# Patient Record
Sex: MALE | Race: WHITE | NOT HISPANIC OR LATINO | Employment: OTHER | URBAN - METROPOLITAN AREA
[De-identification: names, ages, dates, MRNs, and addresses within clinical notes are randomized per-mention and may not be internally consistent; named-entity substitution may affect disease eponyms.]

---

## 2017-04-04 ENCOUNTER — HOSPITAL ENCOUNTER (EMERGENCY)
Facility: HOSPITAL | Age: 47
Discharge: HOME | End: 2017-04-04
Attending: EMERGENCY MEDICINE
Payer: COMMERCIAL

## 2017-04-04 VITALS
DIASTOLIC BLOOD PRESSURE: 116 MMHG | OXYGEN SATURATION: 100 % | HEART RATE: 86 BPM | WEIGHT: 195 LBS | HEIGHT: 70 IN | TEMPERATURE: 99 F | RESPIRATION RATE: 16 BRPM | BODY MASS INDEX: 27.92 KG/M2 | SYSTOLIC BLOOD PRESSURE: 192 MMHG

## 2017-04-04 DIAGNOSIS — G89.29 CHRONIC MIDLINE LOW BACK PAIN WITH BILATERAL SCIATICA: ICD-10-CM

## 2017-04-04 DIAGNOSIS — M54.41 CHRONIC MIDLINE LOW BACK PAIN WITH BILATERAL SCIATICA: ICD-10-CM

## 2017-04-04 DIAGNOSIS — L20.9 ATOPIC DERMATITIS, UNSPECIFIED TYPE: Primary | ICD-10-CM

## 2017-04-04 DIAGNOSIS — M54.42 CHRONIC MIDLINE LOW BACK PAIN WITH BILATERAL SCIATICA: ICD-10-CM

## 2017-04-04 PROCEDURE — 25000003 PHARM REV CODE 250: Performed by: PHYSICIAN ASSISTANT

## 2017-04-04 PROCEDURE — 99283 EMERGENCY DEPT VISIT LOW MDM: CPT | Mod: 25

## 2017-04-04 PROCEDURE — 63600175 PHARM REV CODE 636 W HCPCS: Performed by: PHYSICIAN ASSISTANT

## 2017-04-04 PROCEDURE — 96372 THER/PROPH/DIAG INJ SC/IM: CPT

## 2017-04-04 PROCEDURE — 99283 EMERGENCY DEPT VISIT LOW MDM: CPT | Mod: ,,, | Performed by: PHYSICIAN ASSISTANT

## 2017-04-04 RX ORDER — SULFAMETHOXAZOLE AND TRIMETHOPRIM 800; 160 MG/1; MG/1
1 TABLET ORAL 2 TIMES DAILY
Qty: 20 TABLET | Refills: 0 | Status: SHIPPED | OUTPATIENT
Start: 2017-04-04 | End: 2017-04-14

## 2017-04-04 RX ORDER — METHYLPREDNISOLONE 4 MG/1
TABLET ORAL
Qty: 1 PACKAGE | Refills: 0 | Status: SHIPPED | OUTPATIENT
Start: 2017-04-04 | End: 2017-04-25

## 2017-04-04 RX ORDER — DEXAMETHASONE SODIUM PHOSPHATE 4 MG/ML
8 INJECTION, SOLUTION INTRA-ARTICULAR; INTRALESIONAL; INTRAMUSCULAR; INTRAVENOUS; SOFT TISSUE
Status: COMPLETED | OUTPATIENT
Start: 2017-04-04 | End: 2017-04-04

## 2017-04-04 RX ORDER — HYDROCODONE BITARTRATE AND ACETAMINOPHEN 5; 325 MG/1; MG/1
1 TABLET ORAL
Status: COMPLETED | OUTPATIENT
Start: 2017-04-04 | End: 2017-04-04

## 2017-04-04 RX ORDER — HYDROCODONE BITARTRATE AND ACETAMINOPHEN 5; 325 MG/1; MG/1
1 TABLET ORAL EVERY 6 HOURS PRN
Qty: 7 TABLET | Refills: 0 | Status: SHIPPED | OUTPATIENT
Start: 2017-04-04

## 2017-04-04 RX ADMIN — HYDROCODONE BITARTRATE AND ACETAMINOPHEN 1 TABLET: 5; 325 TABLET ORAL at 03:04

## 2017-04-04 RX ADMIN — DEXAMETHASONE SODIUM PHOSPHATE 8 MG: 4 INJECTION, SOLUTION INTRAMUSCULAR; INTRAVENOUS at 03:04

## 2017-04-04 NOTE — ED AVS SNAPSHOT
[facility]  [address]  [facility] [address]               David Ayala   2017  2:07 AM   ED    Description:  Male : 1970   Department:  [facility]           Your Care was Coordinated By:     Provider Role From To    Wayne Bingham PA-C Physician Assistant [phone] --      Reason for Visit     Allergic Reaction     Back Pain           Diagnoses this Visit        Comments    Atopic dermatitis, unspecified type    -  Primary     Chronic midline low back pain with bilateral sciatica           ED Disposition     ED Disposition Condition Comment    Discharge             To Do List           Follow-up Information     Follow up with WARREN JACOBO.    Contact information:    [address]  [place] [address]         These Medications        Disp Refills Start End    methylPREDNISolone (MEDROL DOSEPACK) 4 mg tablet 1 Package 0 2017    Take as directed    sulfamethoxazole-trimethoprim 800-160mg (BACTRIM DS) 800-160 mg Tab 20 tablet 0 2017    Take 1 tablet by mouth 2 (two) times daily. - Oral    hydrocodone-acetaminophen 5-325mg (NORCO) 5-325 mg per tablet 7 tablet 0 2017     Take 1 tablet by mouth every 6 (six) hours as needed for Pain. - Oral      [facility]     [facility] Care [facility] -  Assistance  Unless otherwise directed by your provider, please contact [facility], our nurse care line that is available for  assistance.     Registered nurses in the [facility] provide: appointment scheduling, clinical advisement, health education, and other advisory services.  Call: [phone] (toll free)               Medications           START taking these NEW medications        Refills    methylPREDNISolone (MEDROL DOSEPACK) 4 mg tablet 0    Sig: Take as directed    Class: Print    sulfamethoxazole-trimethoprim 800-160mg (BACTRIM DS) 800-160 mg Tab 0     "Sig: Take 1 tablet by mouth 2 (two) times daily.    Class: Print    Route: Oral    hydrocodone-acetaminophen 5-325mg (NORCO) 5-325 mg per tablet 0    Sig: Take 1 tablet by mouth every 6 (six) hours as needed for Pain.    Class: Print    Route: Oral      These medications were administered today        Dose Freq    dexamethasone injection 8 mg 8 mg ED 1 Time    Sig: Inject 2 mLs (8 mg total) into the muscle ED 1 Time.    Class: Normal    Route: Intramuscular    hydrocodone-acetaminophen 5-325mg per tablet 1 tablet 1 tablet ED 1 Time    Sig: Take 1 tablet by mouth ED 1 Time.    Class: Normal    Route: Oral           Verify that the below list of medications is an accurate representation of the medications you are currently taking.  If none reported, the list may be blank. If incorrect, please contact your healthcare provider. Carry this list with you in case of emergency.           Current Medications     GABAPENTIN (NEURONTIN ORAL) Take by mouth.    LISINOPRIL ORAL Take by mouth.    TAMSULOSIN HCL (FLOMAX ORAL) Take by mouth.    dexamethasone injection 8 mg Inject 2 mLs (8 mg total) into the muscle ED 1 Time.    hydrocodone-acetaminophen 5-325mg (NORCO) 5-325 mg per tablet Take 1 tablet by mouth every 6 (six) hours as needed for Pain.    hydrocodone-acetaminophen 5-325mg per tablet 1 tablet Take 1 tablet by mouth ED 1 Time.    methylPREDNISolone (MEDROL DOSEPACK) 4 mg tablet Take as directed    sulfamethoxazole-trimethoprim 800-160mg (BACTRIM DS) 800-160 mg Tab Take 1 tablet by mouth 2 (two) times daily.           Clinical Reference Information           Your Vitals Were     BP Pulse Temp Resp Height Weight    192/116 86 98.6 °F (37 °C) (Oral) 16 5' 10" (1.778 m) 88.5 kg (195 lb)    SpO2 BMI             100% 27.98 kg/m2         Allergies as of 4/4/2017        Reactions    Penicillins Other (See Comments)    unknown      Immunizations Administered on Date of Encounter - 4/4/2017     None      ED Micro, Lab, POCT     " None      ED Imaging Orders     None        Discharge Instructions       followup with PCP  Keep skin clean and dry    Smoking Cessation     If you would like to quit smoking:   You may be eligible for free services if you are a [place] resident and started smoking cigarettes before September 1, 1988.  Call the [employer]oking [employer]ess[employer] ([employer]) toll free at (043) 369-6065 or (382) 416-7610.   Call 1-800-QUIT-NOW if you do not meet the above criteria.   Contact us via email: [email or web address]   View our website for more information: [email or web address]         [facility] complies with applicable Federal civil rights laws and does not discriminate on the basis of race, color, national origin, age, disability, or sex.        Language Assistance Services     ATTENTION: Language assistance services are available, free of charge. Please call [phone].      ATENCIÓN: Si habla español, tiene a bhardwaj disposición servicios gratuitos de asistencia lingüística. Llame al [phone].     CHÚ Ý: N?u b?n nói Ti?ng Vi?t, có các d?ch v? h? tr? ngôn ng? mi?n phí dành cho b?n. G?i s? [phone].

## 2017-04-04 NOTE — ED PROVIDER NOTES
Encounter Date: 4/4/2017       History     Chief Complaint   Patient presents with    Allergic Reaction     Pt reports rash to bilateral legs and stomach from medicine. Unsure which medicine. Denies respiratory problems.    Back Pain     Pt reports having L4-S1 back surgery recently and hyperextended his back yesterday and is in pain.     Review of patient's allergies indicates:   Allergen Reactions    Penicillins Other (See Comments)     unknown     HPI Comments: 47 yo M to the ED with 2 complaints. 1 Hx of chronic low back pain, he feels as though he as pulled a muscle or something. He is here caring for his ill daughter in CCU. He denies any Bowel or bladder loss or numbness. He has paresthesias in both legs. He uses gabapentin and flexeril. He is requesting something for pain for a few days.       He also has a rash to the bilat thighs, buttocks, and abdomen. Rash is papular with some pustules. It is pruritic and diffuse.     Past Medical History:   Diagnosis Date    Back pain     Hypertension      Past Surgical History:   Procedure Laterality Date    BACK SURGERY       No family history on file.  Social History   Substance Use Topics    Smoking status: Current Every Day Smoker    Smokeless tobacco: None    Alcohol use None     Review of Systems   Constitutional: Negative for fever.   HENT: Negative for congestion and sore throat.    Eyes: Negative for itching.   Respiratory: Negative for shortness of breath.    Cardiovascular: Negative for chest pain.   Gastrointestinal: Negative for nausea.   Genitourinary: Negative for dysuria.   Musculoskeletal: Positive for back pain.   Skin: Positive for color change and rash.   Neurological: Negative for weakness.   Hematological: Does not bruise/bleed easily.   Psychiatric/Behavioral: Negative for agitation.       Physical Exam   Initial Vitals   BP Pulse Resp Temp SpO2   04/04/17 0109 04/04/17 0109 04/04/17 0109 04/04/17 0109 04/04/17 0109   192/116 86 16 98.6  °F (37 °C) 100 %     Physical Exam    Constitutional: Vital signs are normal. He appears well-developed and well-nourished. He is not diaphoretic. No distress.   HENT:   Head: Normocephalic and atraumatic.   Right Ear: External ear normal.   Left Ear: External ear normal.   Eyes: Conjunctivae are normal. Right eye exhibits no discharge. Left eye exhibits no discharge.   Cardiovascular: Normal rate, regular rhythm and normal heart sounds. Exam reveals no gallop and no friction rub.    No murmur heard.  Pulmonary/Chest: Breath sounds normal. No respiratory distress. He has no wheezes. He has no rhonchi. He has no rales. He exhibits no tenderness.   Abdominal: Soft. Normal appearance, normal aorta and bowel sounds are normal. He exhibits no distension and no mass. There is no tenderness. There is no rebound and no guarding.   Musculoskeletal: Normal range of motion.   Neurological: He is alert and oriented to person, place, and time.   Skin: Skin is warm and intact.   Papular rash with some pustules, located on the bilateral thighs, buttocks, and abdomen. puritic, non macular.    Psychiatric: He has a normal mood and affect. His speech is normal and behavior is normal. Cognition and memory are normal.         ED Course   Procedures  Labs Reviewed - No data to display          Medical Decision Making:   ED Management:  Pt given a few norco for pain and discomfort  Treated rash as possible folliculitis. DC with PCP followup                   ED Course     Clinical Impression:   The primary encounter diagnosis was Atopic dermatitis, unspecified type. A diagnosis of Chronic midline low back pain with bilateral sciatica was also pertinent to this visit.    Disposition:   Disposition: Discharged  Condition: Stable       Wayne Bingham PA-C  04/04/17 7002

## 2022-08-08 NOTE — DISCHARGE INSTRUCTIONS
followup with PCP  Keep skin clean and dry  
GENERAL: No fever, chills  EYES: no vision changes, no discharge.   ENT: no difficulty swallowing or speaking   CARDIAC: no chest pain/pressure, SOB, lower extremity swelling  PULMONARY: no cough, SOB  GI: no abdominal pain, n/v/d  : no dysuria, no hematuria  SKIN: no rashes, no ecchymosis  NEURO: no headache, +lightheadedness  MSK: No joint pain, myalgia, weakness.